# Patient Record
(demographics unavailable — no encounter records)

---

## 2024-10-18 NOTE — HISTORY OF PRESENT ILLNESS
[Mother] : mother [2% ___ oz/d] : consumes [unfilled] oz of 2%  milk per day [Meat] : meat [___ stools per day] : [unfilled]  stools per day [Normal] : Normal [Brushing teeth] : Brushing teeth [Yes] : Patient goes to dentist yearly [Playtime (60 min/d)] : Playtime 60 min a day [< 2 hrs of screen time] : Less than 2 hrs of screen time [TV in bedroom] : TV in bedroom [Appropiate parent-child-sibling interaction] : Appropriate parent-child-sibling interaction [Child Cooperates] : Child cooperates [Parent has appropriate responses to behavior] : Parent has appropriate responses to behavior [Grade ___] : Grade [unfilled] [No] : No cigarette smoke exposure [Car seat in back seat] : Car seat in back seat [YES] : Yes [Are there any firearms stored in your household that are loaded?] : There are firearms stored in the household loaded. [Are there any children in your household?] : There are children in the household. [Have you attended a firearm safety workshop or class?] : A firearm safety workshop or class has been attended. [Vegetables] : vegetables [Are there any unlocked firearms stored in your household?] : No unlocked firearms in the household. [Has anyone in the household been feeling low/depressed/been struggling?] : No one in the household has been feeling low/depressed/been struggling.

## 2024-10-18 NOTE — DISCUSSION/SUMMARY
[Normal Growth] : growth [Normal Development] : development  [No Elimination Concerns] : elimination [Continue Regimen] : feeding [No Skin Concerns] : skin [Normal Sleep Pattern] : sleep [None] : no medical problems [Anticipatory Guidance Given] : Anticipatory guidance addressed as per the history of present illness section [No Vaccines] : no vaccines needed [No Medications] : ~He/She~ is not on any medications [School Readiness] : school readiness [Mental Health] : mental health [Nutrition and Physical Activity] : nutrition and physical activity [Oral Health] : oral health [Safety] : safety [FreeTextEntry1] : Continue balanced diet with all food groups. Brush teeth twice a day with toothbrush. Recommend visit to dentist. As per car seat 's guidelines, use foward-facing booster seat until child reaches highest weight/height for seat. Child needs to ride in a belt-positioning booster seat until  4 feet 9 inches has been reached and are between 8 and 12 years of age. Put child to sleep in own bed. Help child to maintain consistent daily routines and sleep schedule.  discussed. Ensure home is safe. Teach child about personal safety. Use consistent, positive discipline. Read aloud to child. Limit screen time to no more than 2 hours per day. Return 1 year for routine well child check.

## 2024-11-12 NOTE — PHYSICAL EXAM
[Well Nourished] : well nourished [Well Developed] : well developed [Alert] : ~L alert [Active] : active [Normal Breathing Pattern] : normal breathing pattern [No Respiratory Distress] : no respiratory distress [No Allergic Shiners] : no allergic shiners [No Drainage] : no drainage [No Conjunctivitis] : no conjunctivitis [Tympanic Membranes Clear] : tympanic membranes were clear [No Nasal Drainage] : no nasal drainage [No Polyps] : no polyps [No Oral Pallor] : no oral pallor [No Oral Cyanosis] : no oral cyanosis [Non-Erythematous] : non-erythematous [No Exudates] : no exudates [No Postnasal Drip] : no postnasal drip [No Tonsillar Enlargement] : no tonsillar enlargement [Absence Of Retractions] : absence of retractions [Symmetric] : symmetric [Good Expansion] : good expansion [No Acc Muscle Use] : no accessory muscle use [Good aeration to bases] : good aeration to bases [Equal Breath Sounds] : equal breath sounds bilaterally [No Crackles] : no crackles [No Rhonchi] : no rhonchi [No Wheezing] : no wheezing [Normal Sinus Rhythm] : normal sinus rhythm [No Heart Murmur] : no heart murmur [Soft, Non-Tender] : soft, non-tender [No Hepatosplenomegaly] : no hepatosplenomegaly [Non Distended] : was not ~L distended [Abdomen Mass (___ Cm)] : no abdominal mass palpated [Full ROM] : full range of motion [No Clubbing] : no clubbing [Capillary Refill < 2 secs] : capillary refill less than two seconds [No Cyanosis] : no cyanosis [No Petechiae] : no petechiae [No Kyphoscoliosis] : no kyphoscoliosis [No Contractures] : no contractures [Alert and  Oriented] : alert and oriented [No Abnormal Focal Findings] : no abnormal focal findings [Normal Muscle Tone And Reflexes] : normal muscle tone and reflexes [No Rashes] : no rashes [No Skin Lesions] : no skin lesions [FreeTextEntry1] : dry cough [FreeTextEntry4] : congested nasal turbinates

## 2024-11-12 NOTE — ASSESSMENT
[FreeTextEntry1] : 5 yo male, with recurrent cough and wheeze with viral triggers. He falls under the overarching concept of  wheeze and cough and presents with mild persistent asthma. There is a paternal history of asthma. He has mild allergic rhinitis symptoms in the springtime. No concerns re evolving allergic triggers.  He has been off inhaled steroid in the summertime. He is S/P T and A 12/23/23 for snoring. Snoring intensity has abated post-procedure.  I reviewed intermittent regimen with inhaled steroid such that frequency of use or intervals of wellness will inform the decision re. need to go back to daily use perhaps in late fall through wintertime (as was the case last year). He has acute URI and mom has implemented the regimen; lungs are clear to auscultation 3 hours after albuterol.   I have also provided prescription for saline nebs and standby prednisolone for refractory wheeze aside from instructions re. urgent  care.  REcommendations: 1. When sick with a cold and with cough or wheeze, use fluticasone propionate 44 ucg 2 puffs BID for about a week or until better. keep track of usage and response. rinse mouth after use. 2. Indications for albuterol were reviewed. 3. Aerochamber use reviewed. 4. Nebulized saline 2-3 times a day for nose or chest congestion. 5. Prednisolone on standby as instructed. 6.  Follow up in Feb. 2025.

## 2024-11-12 NOTE — HISTORY OF PRESENT ILLNESS
[FreeTextEntry1] : INterval history: Seen by PCP 9/21/24 for cough, colds, Prescribed amoxicillin. Clear lungs, continue albuterol. Well  10/18/24.  Seen 11/12/24 by PCP for cough, no fever.  Has been doing well.  Good exercise tolerance, no sleep disturbances. Mom is not concerned about allergic triggers.  Sick with a cold the past 2 days and with dry cough. Mom started inhaled steroid and albuterol yesterday. last dose of albuterol was about 3 hours prior to this visit. In first grade.   last seen 9/9/24: Almost 5 yo male, a term gestation, with recurrent cough and wheeze with viral triggers. He falls under the overarching concept of  wheeze and cough and presents with mild persistent asthma. There is a paternal history of asthma. He has mild allergic rhinitis symptoms in the springtime.  He has been off inhaled steroid in the summertime. He is S/P T and A 12/23/23 for snoring. Snoring intensity has abated post-procedure.  I reviewed intermittent regimen with inhaled steroid such that frequency of use or intervals of wellness will inform the decision re. need to go back to daily use perhaps in late fall through wintertime (as was the case last year). Mom will continue observing the nighttime cough; it is dry and does not interfere with sleep and not associated with nasal congestion or GI symptoms. She is not worried at this time and thinks he might be "swallowing saliva".  REcommendations: 1. When sick with a cold and with cough or wheeze, use fluticasone propionate 44 ucg 2 puffs BID for about a week or until better. keep track of usage and response. rinse mouth after use. 2. Indications for albuterol were reviewed. 3. Aerochamber use reviewed. 4. Follow up in the fall.

## 2024-11-12 NOTE — HISTORY OF PRESENT ILLNESS
[FreeTextEntry1] : INterval history: Seen by PCP 9/21/24 for cough, colds, Prescribed amoxicillin. Clear lungs, continue albuterol. Well  10/18/24.  Seen 11/12/24 by PCP for cough, no fever.  Has been doing well.  Good exercise tolerance, no sleep disturbances. Mom is not concerned about allergic triggers.  Sick with a cold the past 2 days and with dry cough. Mom started inhaled steroid and albuterol yesterday. last dose of albuterol was about 3 hours prior to this visit. In first grade.   last seen 9/9/24: Almost 7 yo male, a term gestation, with recurrent cough and wheeze with viral triggers. He falls under the overarching concept of  wheeze and cough and presents with mild persistent asthma. There is a paternal history of asthma. He has mild allergic rhinitis symptoms in the springtime.  He has been off inhaled steroid in the summertime. He is S/P T and A 12/23/23 for snoring. Snoring intensity has abated post-procedure.  I reviewed intermittent regimen with inhaled steroid such that frequency of use or intervals of wellness will inform the decision re. need to go back to daily use perhaps in late fall through wintertime (as was the case last year). Mom will continue observing the nighttime cough; it is dry and does not interfere with sleep and not associated with nasal congestion or GI symptoms. She is not worried at this time and thinks he might be "swallowing saliva".  REcommendations: 1. When sick with a cold and with cough or wheeze, use fluticasone propionate 44 ucg 2 puffs BID for about a week or until better. keep track of usage and response. rinse mouth after use. 2. Indications for albuterol were reviewed. 3. Aerochamber use reviewed. 4. Follow up in the fall.

## 2024-11-12 NOTE — HISTORY OF PRESENT ILLNESS
[de-identified] : cough [FreeTextEntry6] : pt is coughing, no fever coughing x 1 week intermittently but has worsen in the past 2 days and becoming productive clear runny nose

## 2024-12-17 NOTE — HISTORY OF PRESENT ILLNESS
[de-identified] : coughing and congestion  [FreeTextEntry6] : patient with vomiting and fever 101F this morning  cough and congestion

## 2024-12-20 NOTE — HISTORY OF PRESENT ILLNESS
[Max Temp: ____] : Max temperature: [unfilled] [de-identified] : coughing has gotten worse and still fever  [FreeTextEntry6] : pt is positive for rsv and has still been vomiting and having fevers weak and lethargic as well  postossive vomiting

## 2024-12-20 NOTE — HISTORY OF PRESENT ILLNESS
[Max Temp: ____] : Max temperature: [unfilled] [de-identified] : coughing has gotten worse and still fever  [FreeTextEntry6] : pt is positive for rsv and has still been vomiting and having fevers weak and lethargic as well  postossive vomiting soft/tender.../nondistended

## 2025-02-10 NOTE — IMPRESSION
[Spirometry] : Spirometry [Normal Spirometry] : spirometry normal [FreeTextEntry1] : First attempt at spirometry. NOrmal expiratory flows.

## 2025-02-10 NOTE — ASSESSMENT
[FreeTextEntry1] : 7 yo male, with recurrent cough and wheeze with viral triggers. He falls under the overarching concept of  wheeze and cough and presents with mild persistent asthma. There is a paternal history of asthma. He has mild allergic rhinitis symptoms in the springtime. No concerns re evolving allergic triggers.  He has been off inhaled steroid in the summertime. He is S/P T and A 12/23/23 for snoring. Snoring intensity has abated post-procedure.  I reviewed intermittent regimen with inhaled steroid such that frequency of use or intervals of wellness will inform the decision re. need to go back to daily use. He has had back to back RSV and norovirus infections in December and necessitated a course of oral steroids.  CXR then was normal. Recovery has been uneventful since then. He attempted spirometry today - effort is suboptimal but expiratory flows are within normal.  REcommendations: 1. When sick with a cold and with cough or wheeze, use fluticasone propionate 44 ucg 2 puffs BID  until better. keep track of usage and response. rinse mouth after use. 2. Indications for albuterol were reviewed. 3. Aerochamber use reviewed. 4. Nebulized saline 2-3 times a day for nose or chest congestion. 5. Prednisolone on standby as instructed. 6. Follow up in May 2025.

## 2025-02-10 NOTE — ASSESSMENT
[FreeTextEntry1] : 5 yo male, with recurrent cough and wheeze with viral triggers. He falls under the overarching concept of  wheeze and cough and presents with mild persistent asthma. There is a paternal history of asthma. He has mild allergic rhinitis symptoms in the springtime. No concerns re evolving allergic triggers.  He has been off inhaled steroid in the summertime. He is S/P T and A 12/23/23 for snoring. Snoring intensity has abated post-procedure.  I reviewed intermittent regimen with inhaled steroid such that frequency of use or intervals of wellness will inform the decision re. need to go back to daily use. He has had back to back RSV and norovirus infections in December and necessitated a course of oral steroids.  CXR then was normal. Recovery has been uneventful since then. He attempted spirometry today - effort is suboptimal but expiratory flows are within normal.  REcommendations: 1. When sick with a cold and with cough or wheeze, use fluticasone propionate 44 ucg 2 puffs BID  until better. keep track of usage and response. rinse mouth after use. 2. Indications for albuterol were reviewed. 3. Aerochamber use reviewed. 4. Nebulized saline 2-3 times a day for nose or chest congestion. 5. Prednisolone on standby as instructed. 6. Follow up in May 2025.

## 2025-02-10 NOTE — HISTORY OF PRESENT ILLNESS
[FreeTextEntry1] : Interval history: Seen by PCP 12/17/24: Acute bronchitis, prescribed azithromycin. Follow up 12/19/24 for worsening of cough. Wheezing on exam.  Started prednisolone. CXR was normal. REcovered from RSV and noroviral infections in DEcember. Last dose of albuterol was in December.  No sleep disturbances. Has good exercise tolerance. Mom is not concerned about wintertime allergies.    ACT 24 Last seen 11/12/24; 7 yo male, with recurrent cough and wheeze with viral triggers. He falls under the overarching concept of  wheeze and cough and presents with mild persistent asthma. There is a paternal history of asthma. He has mild allergic rhinitis symptoms in the springtime. No concerns re evolving allergic triggers.  He has been off inhaled steroid in the summertime. He is S/P T and A 12/23/23 for snoring. Snoring intensity has abated post-procedure.  I reviewed intermittent regimen with inhaled steroid such that frequency of use or intervals of wellness will inform the decision re. need to go back to daily use perhaps in late fall through wintertime (as was the case last year). He has acute URI and mom has implemented the regimen; lungs are clear to auscultation 3 hours after albuterol.  I have also provided prescription for saline nebs and standby prednisolone for refractory wheeze aside from instructions re. urgent care.  REcommendations: 1. When sick with a cold and with cough or wheeze, use fluticasone propionate 44 ucg 2 puffs BID for about a week or until better. keep track of usage and response. rinse mouth after use. 2. Indications for albuterol were reviewed. 3. Aerochamber use reviewed. 4. Nebulized saline 2-3 times a day for nose or chest congestion. 5. Prednisolone on standby as instructed. 6. Follow up in Feb. 2025.

## 2025-05-01 NOTE — ASSESSMENT
[FreeTextEntry1] : 5 yo male, with recurrent cough and wheeze with viral triggers. He falls under the overarching concept of  wheeze and cough and presented with mild persistent asthma. There is a paternal history of asthma. He has mild allergic rhinitis symptoms in the springtime. No concerns re evolving allergic triggers.  He has been off inhaled steroid in the summertime. He is S/P T and A 12/23/23 for snoring. Snoring intensity has abated post-procedure.  Asthma has become intermittent with clustering in cold weather months.  He is well between episodes and has complete recovery when sick. I reviewed intermittent regimen with inhaled steroid such that frequency of use or intervals of wellness will inform the decision re. need to go back to daily use. Mom is very comfortable re. use of this regimen.  REcommendations: 1. When sick with a cold and with cough or wheeze, use Asmanex 50 ucg 2 puffs BID until better. keep track of usage and response. rinse mouth after use. 2. Indications for albuterol were reviewed. 3. Aerochamber use reviewed. 4. Follow up in SEpt.

## 2025-05-01 NOTE — HISTORY OF PRESENT ILLNESS
[FreeTextEntry1] : Interval History: following the illness cited below, he has been doing better. COugh has resolved. Has good exercise tolerance. NO concerns at this time re. springtime rhinitis symptoms.  No snoring.  Phone triage 4/4/425: Asmaex 50 ucg  is now his inhaler. -dry cough x 6-7 days, no fever, no distress, does not cough at night -cough is more with activity and running around with sibling -using Albuterol 3-4x a day while awake -has Albuterol in school, but child must ask to get inhaler -advised that parent call school and ask to give it to him Q4 while he is sick, as child may not realize he needs medication   Last seen 2/10/25:5 yo male, with recurrent cough and wheeze with viral triggers. He falls under the overarching concept of  wheeze and cough and presents with mild persistent asthma. There is a paternal history of asthma. He has mild allergic rhinitis symptoms in the springtime. No concerns re evolving allergic triggers.  He has been off inhaled steroid in the summertime. He is S/P T and A 12/23/23 for snoring. Snoring intensity has abated post-procedure.  I reviewed intermittent regimen with inhaled steroid such that frequency of use or intervals of wellness will inform the decision re. need to go back to daily use. He has had back to back RSV and norovirus infections in December and necessitated a course of oral steroids. CXR then was normal. Recovery has been uneventful since then. He attempted spirometry today - effort is suboptimal but expiratory flows are within normal.  REcommendations: 1. When sick with a cold and with cough or wheeze, use fluticasone propionate 44 ucg 2 puffs BID until better. keep track of usage and response. rinse mouth after use. 2. Indications for albuterol were reviewed. 3. Aerochamber use reviewed. 4. Nebulized saline 2-3 times a day for nose or chest congestion. 5. Prednisolone on standby as instructed. 6. Follow up in May 2025.

## 2025-05-01 NOTE — ASSESSMENT
[FreeTextEntry1] : 7 yo male, with recurrent cough and wheeze with viral triggers. He falls under the overarching concept of  wheeze and cough and presented with mild persistent asthma. There is a paternal history of asthma. He has mild allergic rhinitis symptoms in the springtime. No concerns re evolving allergic triggers.  He has been off inhaled steroid in the summertime. He is S/P T and A 12/23/23 for snoring. Snoring intensity has abated post-procedure.  Asthma has become intermittent with clustering in cold weather months.  He is well between episodes and has complete recovery when sick. I reviewed intermittent regimen with inhaled steroid such that frequency of use or intervals of wellness will inform the decision re. need to go back to daily use. Mom is very comfortable re. use of this regimen.  REcommendations: 1. When sick with a cold and with cough or wheeze, use Asmanex 50 ucg 2 puffs BID until better. keep track of usage and response. rinse mouth after use. 2. Indications for albuterol were reviewed. 3. Aerochamber use reviewed. 4. Follow up in SEpt.

## 2025-05-01 NOTE — PHYSICAL EXAM
[Well Nourished] : well nourished [Well Developed] : well developed [Alert] : ~L alert [Active] : active [Normal Breathing Pattern] : normal breathing pattern [No Respiratory Distress] : no respiratory distress [No Allergic Shiners] : no allergic shiners [No Drainage] : no drainage [No Conjunctivitis] : no conjunctivitis [Nasal Mucosa Non-Edematous] : nasal mucosa non-edematous [No Nasal Drainage] : no nasal drainage [No Polyps] : no polyps [No Sinus Tenderness] : no sinus tenderness [No Oral Pallor] : no oral pallor [No Oral Cyanosis] : no oral cyanosis [Non-Erythematous] : non-erythematous [No Exudates] : no exudates [No Postnasal Drip] : no postnasal drip [No Tonsillar Enlargement] : no tonsillar enlargement [Absence Of Retractions] : absence of retractions [Symmetric] : symmetric [Good Expansion] : good expansion [No Acc Muscle Use] : no accessory muscle use [Good aeration to bases] : good aeration to bases [Equal Breath Sounds] : equal breath sounds bilaterally [No Crackles] : no crackles [No Rhonchi] : no rhonchi [No Wheezing] : no wheezing [Normal Sinus Rhythm] : normal sinus rhythm [No Heart Murmur] : no heart murmur [Soft, Non-Tender] : soft, non-tender [No Hepatosplenomegaly] : no hepatosplenomegaly [Non Distended] : was not ~L distended [Abdomen Mass (___ Cm)] : no abdominal mass palpated [Full ROM] : full range of motion [No Clubbing] : no clubbing [Capillary Refill < 2 secs] : capillary refill less than two seconds [No Cyanosis] : no cyanosis [No Petechiae] : no petechiae [No Kyphoscoliosis] : no kyphoscoliosis [No Contractures] : no contractures [Alert and  Oriented] : alert and oriented [No Abnormal Focal Findings] : no abnormal focal findings [Normal Muscle Tone And Reflexes] : normal muscle tone and reflexes [No Birth Marks] : no birth marks [No Rashes] : no rashes [No Skin Lesions] : no skin lesions [FreeTextEntry3] : not done

## 2025-05-08 NOTE — PHYSICAL EXAM
[Clear Rhinorrhea] : clear rhinorrhea [Hypertrophied Nasal Mucosa] : hypertrophied nasal mucosa [Wheezing] : wheezing [Rales] : rales [Rhonchi] : no rhonchi [NL] : warm, clear

## 2025-05-08 NOTE — HISTORY OF PRESENT ILLNESS
[de-identified] : seasonal allergies and congestion  [FreeTextEntry6] : patient with seasonal allergies cough  using albuterol pump no fever

## 2025-05-08 NOTE — DISCUSSION/SUMMARY
[FreeTextEntry1] : Symptoms likely due to viral URI. Recommend supportive care including antipyretics, fluids, and nasal saline followed by nasal suction. Return if symptoms worsen or persist. if worsens to call back if Sob or tachypnea or distress to go to ER